# Patient Record
Sex: MALE | Race: WHITE
[De-identification: names, ages, dates, MRNs, and addresses within clinical notes are randomized per-mention and may not be internally consistent; named-entity substitution may affect disease eponyms.]

---

## 2019-11-18 ENCOUNTER — APPOINTMENT (OUTPATIENT)
Dept: FAMILY MEDICINE | Facility: CLINIC | Age: 34
End: 2019-11-18
Payer: COMMERCIAL

## 2019-11-18 ENCOUNTER — TRANSCRIPTION ENCOUNTER (OUTPATIENT)
Age: 34
End: 2019-11-18

## 2019-11-18 VITALS
SYSTOLIC BLOOD PRESSURE: 132 MMHG | RESPIRATION RATE: 15 BRPM | OXYGEN SATURATION: 98 % | HEIGHT: 68 IN | DIASTOLIC BLOOD PRESSURE: 84 MMHG | WEIGHT: 215 LBS | BODY MASS INDEX: 32.58 KG/M2 | HEART RATE: 72 BPM

## 2019-11-18 DIAGNOSIS — Z13.29 ENCOUNTER FOR SCREENING FOR OTHER SUSPECTED ENDOCRINE DISORDER: ICD-10-CM

## 2019-11-18 DIAGNOSIS — Z82.0 FAMILY HISTORY OF EPILEPSY AND OTHER DISEASES OF THE NERVOUS SYSTEM: ICD-10-CM

## 2019-11-18 DIAGNOSIS — Z13.1 ENCOUNTER FOR SCREENING FOR DIABETES MELLITUS: ICD-10-CM

## 2019-11-18 DIAGNOSIS — Z13.39 ENCOUNTER FOR SCREENING EXAM FOR OTHER MENTAL HEALTH AND BEHAVIORAL DISORDERS: ICD-10-CM

## 2019-11-18 DIAGNOSIS — Z11.4 ENCOUNTER FOR SCREENING FOR HUMAN IMMUNODEFICIENCY VIRUS [HIV]: ICD-10-CM

## 2019-11-18 DIAGNOSIS — K80.20 CALCULUS OF GALLBLADDER W/OUT CHOLECYSTITIS W/OUT OBSTRUCTION: ICD-10-CM

## 2019-11-18 DIAGNOSIS — Z00.00 ENCOUNTER FOR GENERAL ADULT MEDICAL EXAMINATION W/OUT ABNORMAL FINDINGS: ICD-10-CM

## 2019-11-18 DIAGNOSIS — T78.40XA ALLERGY, UNSPECIFIED, INITIAL ENCOUNTER: ICD-10-CM

## 2019-11-18 DIAGNOSIS — Z13.220 ENCOUNTER FOR SCREENING FOR LIPOID DISORDERS: ICD-10-CM

## 2019-11-18 DIAGNOSIS — Z13.31 ENCOUNTER FOR SCREENING FOR DEPRESSION: ICD-10-CM

## 2019-11-18 DIAGNOSIS — Z78.9 OTHER SPECIFIED HEALTH STATUS: ICD-10-CM

## 2019-11-18 PROCEDURE — G0442 ANNUAL ALCOHOL SCREEN 15 MIN: CPT

## 2019-11-18 PROCEDURE — 99385 PREV VISIT NEW AGE 18-39: CPT | Mod: 25

## 2019-11-18 PROCEDURE — G0444 DEPRESSION SCREEN ANNUAL: CPT

## 2019-11-18 RX ORDER — MONTELUKAST SODIUM 10 MG/1
10 TABLET, FILM COATED ORAL
Refills: 0 | Status: ACTIVE | COMMUNITY

## 2019-11-18 RX ORDER — FEXOFENADINE HCL 60 MG
CAPSULE ORAL
Refills: 0 | Status: ACTIVE | COMMUNITY

## 2019-11-18 RX ORDER — AZELASTINE HYDROCHLORIDE 137 UG/1
0.1 SPRAY, METERED NASAL
Refills: 0 | Status: ACTIVE | COMMUNITY

## 2019-11-18 NOTE — HEALTH RISK ASSESSMENT
[Good] : ~his/her~ current health as good [No] : In the past 12 months have you used drugs other than those required for medical reasons? No [No falls in past year] : Patient reported no falls in the past year [0] : 1) Little interest or pleasure doing things: Not at all (0) [HIV Test offered] : HIV Test offered [Employed] : employed [With Family] : lives with family [None] : None [] :  [# Of Children ___] : has [unfilled] children [Sexually Active] : sexually active [Feels Safe at Home] : Feels safe at home [Fully functional (using the telephone, shopping, preparing meals, housekeeping, doing laundry, using] : Fully functional and needs no help or supervision to perform IADLs (using the telephone, shopping, preparing meals, housekeeping, doing laundry, using transportation, managing medications and managing finances) [Fully functional (bathing, dressing, toileting, transferring, walking, feeding)] : Fully functional (bathing, dressing, toileting, transferring, walking, feeding) [Smoke Detector] : smoke detector [Carbon Monoxide Detector] : carbon monoxide detector [Seat Belt] :  uses seat belt [With Patient/Caregiver] : With Patient/Caregiver [Name: ___] : Health Care Proxy's Name: [unfilled]  [Relationship: ___] : Relationship: [unfilled] [[id number]] : 0 [] : No [Change in mental status noted] : No change in mental status noted [Language] : denies difficulty with language [Behavior] : denies difficulty with behavior [Reasoning] : denies difficulty with reasoning [High Risk Behavior] : no high risk behavior [Reports changes in hearing] : Reports no changes in hearing [Reports changes in vision] : Reports no changes in vision [Reports changes in dental health] : Reports no changes in dental health [FreeTextEntry2] :   [AdvancecareDate] : 11/2019

## 2019-11-18 NOTE — PHYSICAL EXAM
[Well Nourished] : well nourished [No Acute Distress] : no acute distress [Well-Appearing] : well-appearing [Well Developed] : well developed [Normal Sclera/Conjunctiva] : normal sclera/conjunctiva [Normal Outer Ear/Nose] : the outer ears and nose were normal in appearance [EOMI] : extraocular movements intact [PERRL] : pupils equal round and reactive to light [Normal Oropharynx] : the oropharynx was normal [Normal TMs] : both tympanic membranes were normal [Supple] : supple [No Respiratory Distress] : no respiratory distress  [No Lymphadenopathy] : no lymphadenopathy [Thyroid Normal, No Nodules] : the thyroid was normal and there were no nodules present [Normal Rate] : normal rate  [Clear to Auscultation] : lungs were clear to auscultation bilaterally [Regular Rhythm] : with a regular rhythm [No Accessory Muscle Use] : no accessory muscle use [Normal S1, S2] : normal S1 and S2 [No Murmur] : no murmur heard [Pedal Pulses Present] : the pedal pulses are present [No Edema] : there was no peripheral edema [Soft] : abdomen soft [Non-distended] : non-distended [Non Tender] : non-tender [No HSM] : no HSM [No Masses] : no abdominal mass palpated [Normal Bowel Sounds] : normal bowel sounds [Normal Posterior Cervical Nodes] : no posterior cervical lymphadenopathy [No CVA Tenderness] : no CVA  tenderness [No Spinal Tenderness] : no spinal tenderness [Normal Anterior Cervical Nodes] : no anterior cervical lymphadenopathy [Grossly Normal Strength/Tone] : grossly normal strength/tone [No Joint Swelling] : no joint swelling [No Rash] : no rash [Normal Gait] : normal gait [No Focal Deficits] : no focal deficits [Normal Insight/Judgement] : insight and judgment were intact [Alert and Oriented x3] : oriented to person, place, and time [Normal Affect] : the affect was normal

## 2019-11-18 NOTE — ASSESSMENT
[FreeTextEntry1] : Health maintenance\par - comprehensive labs\par - negative depression screen\par - negative alcohol screen\par - up to date with tdap\par - declines flu shot

## 2019-11-18 NOTE — HISTORY OF PRESENT ILLNESS
[FreeTextEntry1] : cpe [de-identified] : Patient is here today for a physical. \par Overall doing well. \par

## 2019-11-22 ENCOUNTER — APPOINTMENT (OUTPATIENT)
Dept: FAMILY MEDICINE | Facility: CLINIC | Age: 34
End: 2019-11-22

## 2019-11-26 ENCOUNTER — TRANSCRIPTION ENCOUNTER (OUTPATIENT)
Age: 34
End: 2019-11-26

## 2019-11-26 LAB
25(OH)D3 SERPL-MCNC: 32.4 NG/ML
ALBUMIN SERPL ELPH-MCNC: 5.1 G/DL
ALP BLD-CCNC: 63 U/L
ALT SERPL-CCNC: 69 U/L
ANION GAP SERPL CALC-SCNC: 13 MMOL/L
APPEARANCE: CLEAR
AST SERPL-CCNC: 27 U/L
BASOPHILS # BLD AUTO: 0.03 K/UL
BASOPHILS NFR BLD AUTO: 0.6 %
BILIRUB SERPL-MCNC: 1.1 MG/DL
BILIRUBIN URINE: NEGATIVE
BLOOD URINE: NEGATIVE
BUN SERPL-MCNC: 11 MG/DL
CALCIUM SERPL-MCNC: 9.8 MG/DL
CHLORIDE SERPL-SCNC: 98 MMOL/L
CHOLEST SERPL-MCNC: 189 MG/DL
CHOLEST/HDLC SERPL: 4.1 RATIO
CO2 SERPL-SCNC: 28 MMOL/L
COLOR: NORMAL
CREAT SERPL-MCNC: 0.94 MG/DL
EOSINOPHIL # BLD AUTO: 0.1 K/UL
EOSINOPHIL NFR BLD AUTO: 2 %
ESTIMATED AVERAGE GLUCOSE: 97 MG/DL
GLUCOSE QUALITATIVE U: NEGATIVE
GLUCOSE SERPL-MCNC: 102 MG/DL
HBA1C MFR BLD HPLC: 5 %
HCT VFR BLD CALC: 51.8 %
HDLC SERPL-MCNC: 46 MG/DL
HGB BLD-MCNC: 17.5 G/DL
HIV1+2 AB SPEC QL IA.RAPID: NONREACTIVE
IMM GRANULOCYTES NFR BLD AUTO: 0.8 %
KETONES URINE: NEGATIVE
LDLC SERPL CALC-MCNC: 106 MG/DL
LEUKOCYTE ESTERASE URINE: NEGATIVE
LYMPHOCYTES # BLD AUTO: 1.85 K/UL
LYMPHOCYTES NFR BLD AUTO: 36.6 %
MAN DIFF?: NORMAL
MCHC RBC-ENTMCNC: 30.7 PG
MCHC RBC-ENTMCNC: 33.8 GM/DL
MCV RBC AUTO: 90.9 FL
MONOCYTES # BLD AUTO: 0.41 K/UL
MONOCYTES NFR BLD AUTO: 8.1 %
NEUTROPHILS # BLD AUTO: 2.62 K/UL
NEUTROPHILS NFR BLD AUTO: 51.9 %
NITRITE URINE: NEGATIVE
PH URINE: 7
PLATELET # BLD AUTO: 197 K/UL
POTASSIUM SERPL-SCNC: 4 MMOL/L
PROT SERPL-MCNC: 7.1 G/DL
PROTEIN URINE: NEGATIVE
RBC # BLD: 5.7 M/UL
RBC # FLD: 13.1 %
SODIUM SERPL-SCNC: 139 MMOL/L
SPECIFIC GRAVITY URINE: 1.01
T4 FREE SERPL-MCNC: 1.2 NG/DL
TRIGL SERPL-MCNC: 183 MG/DL
TSH SERPL-ACNC: 3.65 UIU/ML
UROBILINOGEN URINE: NORMAL
WBC # FLD AUTO: 5.05 K/UL

## 2019-11-27 ENCOUNTER — TRANSCRIPTION ENCOUNTER (OUTPATIENT)
Age: 34
End: 2019-11-27

## 2020-06-29 ENCOUNTER — APPOINTMENT (OUTPATIENT)
Dept: INTERNAL MEDICINE | Facility: CLINIC | Age: 35
End: 2020-06-29
Payer: COMMERCIAL

## 2020-06-29 VITALS
HEIGHT: 68 IN | TEMPERATURE: 97.6 F | WEIGHT: 208 LBS | DIASTOLIC BLOOD PRESSURE: 96 MMHG | HEART RATE: 73 BPM | OXYGEN SATURATION: 98 % | SYSTOLIC BLOOD PRESSURE: 126 MMHG | BODY MASS INDEX: 31.52 KG/M2

## 2020-06-29 DIAGNOSIS — K62.5 HEMORRHAGE OF ANUS AND RECTUM: ICD-10-CM

## 2020-06-29 DIAGNOSIS — K62.89 OTHER SPECIFIED DISEASES OF ANUS AND RECTUM: ICD-10-CM

## 2020-06-29 DIAGNOSIS — R79.89 OTHER SPECIFIED ABNORMAL FINDINGS OF BLOOD CHEMISTRY: ICD-10-CM

## 2020-06-29 DIAGNOSIS — D58.2 OTHER HEMOGLOBINOPATHIES: ICD-10-CM

## 2020-06-29 DIAGNOSIS — E78.1 PURE HYPERGLYCERIDEMIA: ICD-10-CM

## 2020-06-29 DIAGNOSIS — Z11.59 ENCOUNTER FOR SCREENING FOR OTHER VIRAL DISEASES: ICD-10-CM

## 2020-06-29 PROCEDURE — 99214 OFFICE O/P EST MOD 30 MIN: CPT

## 2020-06-29 RX ORDER — CIPROFLOXACIN HYDROCHLORIDE AND HYDROCORTISONE 2; 10 MG/ML; MG/ML
0.2-1 SUSPENSION/ DROPS AURICULAR (OTIC)
Qty: 1 | Refills: 0 | Status: ACTIVE | COMMUNITY
Start: 2020-06-29 | End: 1900-01-01

## 2020-06-29 RX ORDER — OFLOXACIN OTIC 3 MG/ML
0.3 SOLUTION AURICULAR (OTIC)
Qty: 1 | Refills: 0 | Status: ACTIVE | COMMUNITY
Start: 2020-06-29 | End: 1900-01-01

## 2020-06-29 NOTE — ASSESSMENT
[FreeTextEntry1] : \par Left ear discomfort:\par -possible due to mild otitis external\par -will tx with cipro HC drops\par -if sx persist will refer to ENT\par \par Mild rectal bleeding/Anal pain:\par -he may have mild fissure\par -will check labs\par -will refer to Colorectal surgery\par \par Elevated LFTs:\par -will check CMP and hepatitis B and C serologies\par -if this persist may need abd US\par \par Elevated H/H:\par -will check CBC\par \par Hypertriglycerdies:\par -will check lipids\par \par obesity:\par -BMI 31\par -he has been exercising and has lost 7 lbs since last visit\par \par Elevated BP today: 126/96\par -he states he has been under some stress recently\par -I advised low fat/low cholesterol diet, low salt diet, and weight loss\par -f/u 3 months for BP check\par \par HCM:\par \par CPE 2019\par \par Depression screenin2019 negative\par \par Flu shot: he did not get this past flu season\par \par Tdap: 2019\par \par HIV testin2019 negative\par \par Covid antibody test offered: he consented to testing\par \par F/U 3 months for BP check-he will have fasting labs done at the lab

## 2020-06-29 NOTE — PHYSICAL EXAM
[No Acute Distress] : no acute distress [Well-Appearing] : well-appearing [Normal Voice/Communication] : normal voice/communication [Normal Sclera/Conjunctiva] : normal sclera/conjunctiva [Normal Oropharynx] : the oropharynx was normal [PERRL] : pupils equal round and reactive to light [Normal Outer Ear/Nose] : the outer ears and nose were normal in appearance [No Lymphadenopathy] : no lymphadenopathy [No JVD] : no jugular venous distention [Thyroid Normal, No Nodules] : the thyroid was normal and there were no nodules present [Supple] : supple [No Respiratory Distress] : no respiratory distress  [No Accessory Muscle Use] : no accessory muscle use [Clear to Auscultation] : lungs were clear to auscultation bilaterally [Normal Rate] : normal rate  [Normal S1, S2] : normal S1 and S2 [Regular Rhythm] : with a regular rhythm [No Edema] : there was no peripheral edema [Soft] : abdomen soft [No Murmur] : no murmur heard [No Masses] : no abdominal mass palpated [Non Tender] : non-tender [Non-distended] : non-distended [No HSM] : no HSM [No Rash] : no rash [Normal Bowel Sounds] : normal bowel sounds [No Focal Deficits] : no focal deficits [Normal Affect] : the affect was normal [Alert and Oriented x3] : oriented to person, place, and time [Normal Mood] : the mood was normal [Normal Insight/Judgement] : insight and judgment were intact [FreeTextEntry1] : external exam done-no obvious hemorrhoids or fissures [de-identified] : nasal mucosa is edematous, turbinates with blue hue, B/L TM intact, tighht ala intact, his left canal has very minimal erythema, no significant swelling

## 2020-06-29 NOTE — HISTORY OF PRESENT ILLNESS
[FreeTextEntry8] : Here today because he has had some muild discomfort in left ear for past week.  he states sx started after he showed and water got into his ear.  He states he has been using some alcohol and OTC drops to see if he can dry out his ear.  he states he has some mild discomfort in left ear and states he feels popping sensation when he burps.  he denies pain.  he states ear does not feel clogged.  HE states there has been minimal improvement since sx started\par \par he also reports he noticed mild rectal bleeding last night when he wiped.  he states he has had some mild anal discomfort when he sits down.  he denies constipation

## 2020-07-08 ENCOUNTER — APPOINTMENT (OUTPATIENT)
Dept: COLORECTAL SURGERY | Facility: CLINIC | Age: 35
End: 2020-07-08
Payer: COMMERCIAL

## 2020-07-08 VITALS
RESPIRATION RATE: 16 BRPM | DIASTOLIC BLOOD PRESSURE: 90 MMHG | TEMPERATURE: 98.1 F | BODY MASS INDEX: 31.52 KG/M2 | WEIGHT: 208 LBS | SYSTOLIC BLOOD PRESSURE: 122 MMHG | HEIGHT: 68 IN

## 2020-07-08 DIAGNOSIS — K64.4 RESIDUAL HEMORRHOIDAL SKIN TAGS: ICD-10-CM

## 2020-07-08 DIAGNOSIS — K64.8 RESIDUAL HEMORRHOIDAL SKIN TAGS: ICD-10-CM

## 2020-07-08 PROCEDURE — 46600 DIAGNOSTIC ANOSCOPY SPX: CPT

## 2020-07-08 PROCEDURE — 99243 OFF/OP CNSLTJ NEW/EST LOW 30: CPT | Mod: 25

## 2020-07-08 NOTE — CONSULT LETTER
[Dear  ___] : Dear  [unfilled], [Please see my note below.] : Please see my note below. [Consult Letter:] : I had the pleasure of evaluating your patient, [unfilled]. [Consult Closing:] : Thank you very much for allowing me to participate in the care of this patient.  If you have any questions, please do not hesitate to contact me. [Sincerely,] : Sincerely, [FreeTextEntry3] : Baljit Malloy M.D. FACS, FASCRS

## 2020-07-08 NOTE — HISTORY OF PRESENT ILLNESS
[FreeTextEntry1] : Consultation requested by Dr. Fran Richards for bleeding hemorrhoids. 34-year-old male with complaints of bleeding hemorrhoids.With history of recent bleeding internal and external hemorrhoids. Symptoms have been present and worsening. Bleeding as bright red with bowel movements. Has occasional pain.

## 2020-07-08 NOTE — PHYSICAL EXAM
[Tender] : nontender [Abdomen Tenderness] : ~T No ~M abdominal tenderness [Abdomen Masses] : No abdominal masses [Normal rectal exam] : exam was normal [Excoriation] : no perianal excoriation [Manually Reducible] : a manually reducible (grade III) [Normal] : was normal [Tender, Swollen] : tender, swollen [None] : there was no rectal abscess [JVD] : no jugular venous distention  [Normal Breath Sounds] : Normal breath sounds [Normal Heart Sounds] : normal heart sounds [Normal Rate and Rhythm] : normal rate and rhythm [No Rash or Lesion] : No rash or lesion [Alert] : alert [Oriented to Person] : oriented to person [Oriented to Place] : oriented to place [Oriented to Time] : oriented to time [Calm] : calm [de-identified] : Looks well in no distress, of stated age. [de-identified] : moves all 4 extremities appropriately with 5 over 5 strength [de-identified] : pupils equal reactive to light normocephalic atraumatic.

## 2020-07-08 NOTE — ASSESSMENT
[FreeTextEntry1] : 34-year-old male with bleeding internal and external hemorrhoids.Recommend high fiber diet, Metamucil daily, sitz baths, stool softeners, pain medications p.r.n.

## 2020-07-21 ENCOUNTER — APPOINTMENT (OUTPATIENT)
Dept: OTOLARYNGOLOGY | Facility: CLINIC | Age: 35
End: 2020-07-21
Payer: COMMERCIAL

## 2020-07-21 VITALS
HEIGHT: 68 IN | WEIGHT: 203 LBS | SYSTOLIC BLOOD PRESSURE: 134 MMHG | HEART RATE: 77 BPM | DIASTOLIC BLOOD PRESSURE: 86 MMHG | TEMPERATURE: 97.9 F | BODY MASS INDEX: 30.77 KG/M2

## 2020-07-21 DIAGNOSIS — H90.3 SENSORINEURAL HEARING LOSS, BILATERAL: ICD-10-CM

## 2020-07-21 DIAGNOSIS — J30.9 ALLERGIC RHINITIS, UNSPECIFIED: ICD-10-CM

## 2020-07-21 DIAGNOSIS — H92.02 OTALGIA, LEFT EAR: ICD-10-CM

## 2020-07-21 DIAGNOSIS — H69.83 OTHER SPECIFIED DISORDERS OF EUSTACHIAN TUBE, BILATERAL: ICD-10-CM

## 2020-07-21 DIAGNOSIS — H61.22 IMPACTED CERUMEN, LEFT EAR: ICD-10-CM

## 2020-07-21 DIAGNOSIS — Z80.9 FAMILY HISTORY OF MALIGNANT NEOPLASM, UNSPECIFIED: ICD-10-CM

## 2020-07-21 PROCEDURE — 92567 TYMPANOMETRY: CPT

## 2020-07-21 PROCEDURE — 92557 COMPREHENSIVE HEARING TEST: CPT

## 2020-07-21 PROCEDURE — 99204 OFFICE O/P NEW MOD 45 MIN: CPT | Mod: 25

## 2020-07-21 RX ORDER — PREDNISONE 10 MG/1
10 TABLET ORAL TWICE DAILY
Qty: 15 | Refills: 2 | Status: ACTIVE | COMMUNITY
Start: 2020-07-21 | End: 1900-01-01

## 2020-07-21 NOTE — REVIEW OF SYSTEMS
[Sinus Pressure] : sinus pressure [Seasonal Allergies] : seasonal allergies [Sinus Pain] : sinus pain [Negative] : Psychiatric [Patient Intake Form Reviewed] : Patient intake form was reviewed

## 2020-07-21 NOTE — ASSESSMENT
[FreeTextEntry1] : cerumen cleared as small amount\par audio wnl a/a\par hx consistent w eust tube dys\par allergic rhinitis\par pred 10 #15\par consider change from allegra to xyzal

## 2020-07-21 NOTE — HISTORY OF PRESENT ILLNESS
[de-identified] : 2 mo hx after shower as popping\par otic gtts  than to primary\par another gtt now co pressure now\par pos seasonal allergies using montelukast and azelastine\par all desens in past

## 2020-07-21 NOTE — CONSULT LETTER
[Consult Letter:] : I had the pleasure of evaluating your patient, [unfilled]. [Consult Closing:] : Thank you very much for allowing me to participate in the care of this patient.  If you have any questions, please do not hesitate to contact me. [Sincerely,] : Sincerely, [FreeTextEntry1] : Dear Dr. Richards,\par \par Thank you for your kind referral. Please refer to my enclosed office notes for FREDA Malone If there are any questions free to contact me.\par  [FreeTextEntry3] : Luis Ceja MD, FACS\par

## 2020-09-30 ENCOUNTER — APPOINTMENT (OUTPATIENT)
Dept: INTERNAL MEDICINE | Facility: CLINIC | Age: 35
End: 2020-09-30

## 2022-03-15 ENCOUNTER — APPOINTMENT (OUTPATIENT)
Dept: COLORECTAL SURGERY | Facility: CLINIC | Age: 37
End: 2022-03-15
Payer: COMMERCIAL

## 2022-03-15 VITALS
SYSTOLIC BLOOD PRESSURE: 120 MMHG | TEMPERATURE: 96.6 F | DIASTOLIC BLOOD PRESSURE: 87 MMHG | WEIGHT: 210 LBS | HEIGHT: 68 IN | HEART RATE: 97 BPM | BODY MASS INDEX: 31.83 KG/M2

## 2022-03-15 PROCEDURE — 99214 OFFICE O/P EST MOD 30 MIN: CPT | Mod: 25

## 2022-03-15 PROCEDURE — 46940 TREATMENT OF ANAL FISSURE: CPT

## 2022-03-16 ENCOUNTER — TRANSCRIPTION ENCOUNTER (OUTPATIENT)
Age: 37
End: 2022-03-16

## 2022-03-16 RX ORDER — NITROGLYCERIN 4 MG/G
0.4 OINTMENT RECTAL
Qty: 1 | Refills: 2 | Status: DISCONTINUED | COMMUNITY
Start: 2022-03-15 | End: 2022-03-16

## 2022-03-20 NOTE — PHYSICAL EXAM
[Abdomen Masses] : No abdominal masses [Abdomen Tenderness] : ~T No ~M abdominal tenderness [Tender] : nontender [Normal rectal exam] : exam was normal [Excoriation] : no perianal excoriation [Manually Reducible] : a manually reducible (grade III) [Tender, Swollen] : tender, swollen [Normal] : was normal [None] : there was no rectal abscess [JVD] : no jugular venous distention  [Normal Breath Sounds] : Normal breath sounds [Normal Heart Sounds] : normal heart sounds [Normal Rate and Rhythm] : normal rate and rhythm [No Rash or Lesion] : No rash or lesion [Oriented to Person] : oriented to person [Alert] : alert [Oriented to Place] : oriented to place [Oriented to Time] : oriented to time [Calm] : calm [de-identified] : Looks well in no distress, of stated age. [de-identified] : pupils equal reactive to light normocephalic atraumatic. [de-identified] : moves all 4 extremities appropriately with 5 over 5 strength

## 2022-03-20 NOTE — ASSESSMENT
[FreeTextEntry1] : 36-year-old male with Anal fissure and bleeding internal and external hemorrhoids.Recommend high fiber diet, Metamucil daily, sitz baths, stool softeners, pain medications p.r.n. Cauterization of anal fissure,  medication

## 2022-03-20 NOTE — HISTORY OF PRESENT ILLNESS
[FreeTextEntry1] : 36-year-old male with complaints of bleeding hemorrhoids and fissure, .With history of recent bleeding internal and external hemorrhoids. Symptoms have been present and worsening. Bleeding as bright red with bowel movements. Has occasional pain.

## 2022-03-20 NOTE — CONSULT LETTER
[Please see my note below.] : Please see my note below. [Consult Closing:] : Thank you very much for allowing me to participate in the care of this patient.  If you have any questions, please do not hesitate to contact me. [Sincerely,] : Sincerely, [FreeTextEntry3] : Baljit Malloy M.D. FACS, FASCRS

## 2022-04-11 PROBLEM — Z11.59 SCREENING FOR VIRAL DISEASE: Status: ACTIVE | Noted: 2020-06-29

## 2022-05-17 ENCOUNTER — APPOINTMENT (OUTPATIENT)
Dept: COLORECTAL SURGERY | Facility: CLINIC | Age: 37
End: 2022-05-17
Payer: COMMERCIAL

## 2022-05-17 VITALS
HEART RATE: 71 BPM | HEIGHT: 68 IN | BODY MASS INDEX: 31.83 KG/M2 | WEIGHT: 210 LBS | DIASTOLIC BLOOD PRESSURE: 87 MMHG | SYSTOLIC BLOOD PRESSURE: 127 MMHG

## 2022-05-17 DIAGNOSIS — K60.2 ANAL FISSURE, UNSPECIFIED: ICD-10-CM

## 2022-05-17 PROCEDURE — 99214 OFFICE O/P EST MOD 30 MIN: CPT

## 2022-06-02 PROBLEM — K60.2 ANAL FISSURE: Status: ACTIVE | Noted: 2022-03-15

## 2022-06-02 NOTE — ASSESSMENT
[FreeTextEntry1] : 36-year-old male with Anal fissure and bleeding internal and external hemorrhoids.Recommend high fiber diet, Metamucil daily, sitz baths, stool softeners, pain medications p.r.n. ,  medication for fissure, diltiazam or nitro

## 2022-06-02 NOTE — PHYSICAL EXAM
[Abdomen Masses] : No abdominal masses [Abdomen Tenderness] : ~T No ~M abdominal tenderness [Tender] : nontender [Normal rectal exam] : exam was normal [Excoriation] : no perianal excoriation [Manually Reducible] : a manually reducible (grade III) [Tender, Swollen] : tender, swollen [Normal] : was normal [None] : there was no rectal mass  [JVD] : no jugular venous distention  [Normal Breath Sounds] : Normal breath sounds [Normal Heart Sounds] : normal heart sounds [Normal Rate and Rhythm] : normal rate and rhythm [No Rash or Lesion] : No rash or lesion [Alert] : alert [Oriented to Person] : oriented to person [Oriented to Place] : oriented to place [Oriented to Time] : oriented to time [Calm] : calm [de-identified] : Looks well in no distress, of stated age. [de-identified] : pupils equal reactive to light normocephalic atraumatic. [de-identified] : moves all 4 extremities appropriately with 5 over 5 strength

## 2025-01-16 ENCOUNTER — APPOINTMENT (OUTPATIENT)
Dept: OTOLARYNGOLOGY | Facility: CLINIC | Age: 40
End: 2025-01-16
Payer: COMMERCIAL

## 2025-01-16 ENCOUNTER — NON-APPOINTMENT (OUTPATIENT)
Age: 40
End: 2025-01-16

## 2025-01-16 VITALS
SYSTOLIC BLOOD PRESSURE: 122 MMHG | HEART RATE: 73 BPM | WEIGHT: 180 LBS | DIASTOLIC BLOOD PRESSURE: 84 MMHG | BODY MASS INDEX: 27.28 KG/M2 | HEIGHT: 68 IN

## 2025-01-16 DIAGNOSIS — H61.22 IMPACTED CERUMEN, LEFT EAR: ICD-10-CM

## 2025-01-16 DIAGNOSIS — H69.93 UNSPECIFIED EUSTACHIAN TUBE DISORDER, BILATERAL: ICD-10-CM

## 2025-01-16 PROCEDURE — 99203 OFFICE O/P NEW LOW 30 MIN: CPT | Mod: 25

## 2025-01-16 PROCEDURE — 69210 REMOVE IMPACTED EAR WAX UNI: CPT | Mod: LT

## 2025-01-27 ENCOUNTER — APPOINTMENT (OUTPATIENT)
Dept: OTOLARYNGOLOGY | Facility: CLINIC | Age: 40
End: 2025-01-27
Payer: COMMERCIAL

## 2025-01-27 VITALS — WEIGHT: 180 LBS | HEIGHT: 68 IN | BODY MASS INDEX: 27.28 KG/M2

## 2025-01-27 DIAGNOSIS — H69.93 UNSPECIFIED EUSTACHIAN TUBE DISORDER, BILATERAL: ICD-10-CM

## 2025-01-27 DIAGNOSIS — H92.02 OTALGIA, LEFT EAR: ICD-10-CM

## 2025-01-27 PROCEDURE — 92557 COMPREHENSIVE HEARING TEST: CPT

## 2025-01-27 PROCEDURE — 92550 TYMPANOMETRY & REFLEX THRESH: CPT | Mod: 22

## 2025-01-27 PROCEDURE — 99213 OFFICE O/P EST LOW 20 MIN: CPT

## 2025-01-27 RX ORDER — PREDNISONE 10 MG/1
10 TABLET ORAL TWICE DAILY
Qty: 15 | Refills: 2 | Status: ACTIVE | COMMUNITY
Start: 2025-01-27 | End: 1900-01-01

## 2025-06-23 ENCOUNTER — APPOINTMENT (OUTPATIENT)
Dept: OTOLARYNGOLOGY | Facility: CLINIC | Age: 40
End: 2025-06-23
Payer: COMMERCIAL

## 2025-06-23 PROCEDURE — 99213 OFFICE O/P EST LOW 20 MIN: CPT

## 2025-06-23 RX ORDER — CIPROFLOXACIN AND DEXAMETHASONE 3; 1 MG/ML; MG/ML
0.3-0.1 SUSPENSION/ DROPS AURICULAR (OTIC)
Qty: 1 | Refills: 2 | Status: ACTIVE | COMMUNITY
Start: 2025-06-23 | End: 1900-01-01